# Patient Record
Sex: MALE | NOT HISPANIC OR LATINO | URBAN - NONMETROPOLITAN AREA
[De-identification: names, ages, dates, MRNs, and addresses within clinical notes are randomized per-mention and may not be internally consistent; named-entity substitution may affect disease eponyms.]

---

## 2017-01-10 ENCOUNTER — FOLLOW UP ESTABLISHED (OUTPATIENT)
Dept: URBAN - NONMETROPOLITAN AREA CLINIC 13 | Facility: CLINIC | Age: 57
End: 2017-01-10
Payer: COMMERCIAL

## 2017-01-10 DIAGNOSIS — H26.491 OTHER SECONDARY CATARACT, RIGHT EYE: Primary | ICD-10-CM

## 2017-01-10 PROCEDURE — 92014 COMPRE OPH EXAM EST PT 1/>: CPT | Performed by: OPHTHALMOLOGY

## 2017-01-10 PROCEDURE — 92134 CPTRZ OPH DX IMG PST SGM RTA: CPT | Performed by: OPHTHALMOLOGY

## 2017-01-10 RX ORDER — PREDNISOLONE ACETATE 10 MG/ML
1 % SUSPENSION/ DROPS OPHTHALMIC
Qty: 1 | Refills: 1 | Status: INACTIVE
Start: 2017-01-10 | End: 2017-05-30

## 2017-01-10 RX ORDER — NEPAFENAC 3 MG/ML
0.3 % SUSPENSION OPHTHALMIC
Qty: 1 | Refills: 1 | Status: INACTIVE
Start: 2017-01-10 | End: 2017-01-11

## 2017-01-10 ASSESSMENT — INTRAOCULAR PRESSURE
OD: 17
OS: 17

## 2017-01-10 ASSESSMENT — VISUAL ACUITY
OS: 20/40
OD: 20/20

## 2017-01-16 ENCOUNTER — Encounter (OUTPATIENT)
Dept: URBAN - NONMETROPOLITAN AREA CLINIC 13 | Facility: CLINIC | Age: 57
End: 2017-01-16
Payer: COMMERCIAL

## 2017-01-16 DIAGNOSIS — Z01.818 ENCOUNTER FOR OTHER PREPROCEDURAL EXAMINATION: Primary | ICD-10-CM

## 2017-01-16 PROCEDURE — 99213 OFFICE O/P EST LOW 20 MIN: CPT | Performed by: PHYSICIAN ASSISTANT

## 2017-01-30 ENCOUNTER — SURGERY (OUTPATIENT)
Dept: URBAN - NONMETROPOLITAN AREA SURGERY 4 | Facility: SURGERY | Age: 57
End: 2017-01-30
Payer: COMMERCIAL

## 2017-01-30 PROCEDURE — 66821 AFTER CATARACT LASER SURGERY: CPT | Performed by: OPHTHALMOLOGY

## 2017-02-07 ENCOUNTER — POST OP (OUTPATIENT)
Dept: URBAN - NONMETROPOLITAN AREA CLINIC 13 | Facility: CLINIC | Age: 57
End: 2017-02-07

## 2017-02-07 PROCEDURE — 99024 POSTOP FOLLOW-UP VISIT: CPT | Performed by: OPHTHALMOLOGY

## 2017-02-07 ASSESSMENT — INTRAOCULAR PRESSURE
OS: 20
OD: 14

## 2017-02-07 ASSESSMENT — VISUAL ACUITY: OD: 20/20

## 2017-02-23 ENCOUNTER — POST OP (OUTPATIENT)
Dept: URBAN - NONMETROPOLITAN AREA CLINIC 13 | Facility: CLINIC | Age: 57
End: 2017-02-23

## 2017-02-23 PROCEDURE — 99024 POSTOP FOLLOW-UP VISIT: CPT | Performed by: OPHTHALMOLOGY

## 2017-02-23 ASSESSMENT — INTRAOCULAR PRESSURE
OD: 15
OS: 16

## 2017-03-28 ENCOUNTER — POST OP (OUTPATIENT)
Dept: URBAN - NONMETROPOLITAN AREA CLINIC 13 | Facility: CLINIC | Age: 57
End: 2017-03-28

## 2017-03-28 DIAGNOSIS — Z96.1 PRESENCE OF INTRAOCULAR LENS: Primary | ICD-10-CM

## 2017-03-28 PROCEDURE — 99024 POSTOP FOLLOW-UP VISIT: CPT | Performed by: OPHTHALMOLOGY

## 2017-03-28 ASSESSMENT — INTRAOCULAR PRESSURE
OD: 17
OS: 18

## 2017-05-30 ENCOUNTER — FOLLOW UP ESTABLISHED (OUTPATIENT)
Dept: URBAN - NONMETROPOLITAN AREA CLINIC 13 | Facility: CLINIC | Age: 57
End: 2017-05-30
Payer: COMMERCIAL

## 2017-05-30 DIAGNOSIS — H35.352 CYSTOID MACULAR DEGENERATION, LEFT EYE: ICD-10-CM

## 2017-05-30 PROCEDURE — 99213 OFFICE O/P EST LOW 20 MIN: CPT | Performed by: OPHTHALMOLOGY

## 2017-05-30 PROCEDURE — 92134 CPTRZ OPH DX IMG PST SGM RTA: CPT | Performed by: OPHTHALMOLOGY

## 2017-05-30 ASSESSMENT — INTRAOCULAR PRESSURE
OD: 14
OS: 13

## 2022-07-07 NOTE — IMPRESSION/PLAN
Impression: Retinal detachment with multiple breaks, right eye: H33.021. Right.
-symptoms x 2 days 
-macula on

OCT: 
OD: flat OS: CME Plan: Examination reveals a retinal detachment. The diagnosis, natural history, and prognosis of rhegmatogenous retinal detachment, as well as the risks and benefits of intervention were discussed at length. . To reduce the risk of further visual loss, treatment is strongly recommended. The patient was informed of various surgical techniques; altitude precautions with a gas bubble, including the danger of loss of the eye with travel to a higher altitude or flying; and the possible need to update spectacle correction if a scleral buckle is used. The patient understands that the vision usually improves gradually over a period of several months to 1 year, but may not improve to prior levels. The patient elects to proceed with retinal detachment repair. Risk of redetachment, or proliferative vitreoretinopathy, scar formation, macular pucker, hyphema, glaucoma, hypotony, infection, loss of vision, loss of eye, blindness were fully explained to the patient who voices understanding and agreed to proceed with surgery. PLAN: 25g PPV, EL, SO OD x RD (Today) Face down for a few days

## 2022-07-07 NOTE — IMPRESSION/PLAN
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
-s/p RD repair 2014 Plan: Retina attached. Doing well. SSRD. Stable vision. Has CME that has likely been persistent. Given stable vision will observe for now. Consider tx in future if changing.

## 2022-07-07 NOTE — IMPRESSION/PLAN
Impression: Retinal detachment with single break, right eye: H33.011. Plan:  Will refer for Surgery ASAP

## 2022-07-08 NOTE — IMPRESSION/PLAN
Impression: S/P 25g PPV, EL, SO x RD OD - 1 Day. Retinal detachment with multiple breaks, right eye  H33.021. Plan: Doing well. Start Rx and positioning.   Return 1 week (NVP)

## 2022-07-19 NOTE — IMPRESSION/PLAN
Impression: S/P 25g PPV, EL, SO x RD OD - 12 Days. Retinal detachment with multiple breaks, right eye  H33.021. Plan: Taper PF. Stop Oflox. Pt complaining of black out of vision when standing. IOP borderline but will start Brimonidine BID OD. Sleep on side.  

RTC 1 month DFE OD OCT OD

## 2022-08-16 NOTE — IMPRESSION/PLAN
Impression: S/P 25g PPV, EL, SO x RD OD - 40 Days. Retinal detachment with multiple breaks, right eye  H33.021. OCT:
OD: wnl OS: CME Plan: Ready for SO removal. 
Sleep on side Continue Brimonidine BID Rec: 25g PPV Removal of SO OD. (30min, non-urgent- schedule in mid-late Sept)

## 2022-09-13 NOTE — IMPRESSION/PLAN
Impression: S/P 25g PPV Removal of SO OD - 1 Day. Retinal detachment with multiple breaks, right eye  H33.021. Plan: PF/Oflox QID. RTC 1-2 weeks DFE OD

## 2022-09-27 NOTE — IMPRESSION/PLAN
Impression: S/P 25g PPV Removal of SO OD - 15 Days. Retinal detachment with multiple breaks, right eye  H33.021. Plan: Taper PF 3-2-1. Stop Oflox.  
 
RTC 1 month POS DFE/OCT OD

## 2022-10-25 NOTE — IMPRESSION/PLAN
Impression: S/P 25g PPV Removal of SO OD - 43 Days. Retinal detachment with single break, right eye  H33.011. OCT:
OD: WNL
OS: CME Plan: OD: off drops. Doing well. OS: chronic CME OS- longstanding. [place]. Will observe.  

RTC PRN